# Patient Record
Sex: MALE | Race: WHITE | NOT HISPANIC OR LATINO | Employment: UNEMPLOYED | URBAN - METROPOLITAN AREA
[De-identification: names, ages, dates, MRNs, and addresses within clinical notes are randomized per-mention and may not be internally consistent; named-entity substitution may affect disease eponyms.]

---

## 2021-07-16 ENCOUNTER — TELEPHONE (OUTPATIENT)
Dept: URGENT CARE | Facility: CLINIC | Age: 46
End: 2021-07-16

## 2021-07-16 NOTE — TELEPHONE ENCOUNTER
Please contact patient to make sure that his symptoms have resolved, and that he has had or scheduled PCP follow-up.  He was concerned about a reaction to the Covid vaccine.  I would expect that the symptoms will have been completely resolved by this point.  If there is worsening of any sort, then he needs ER evaluation or to call his PCP promptly.

## 2021-07-19 NOTE — TELEPHONE ENCOUNTER
MESSAGE LEFT FOR PATIENT TO CALL TO UPDATE US ON HIS SYMPTOMS    07/19/21 1989. PATIENT CALLED BACK AND STATES HIS SYMPTOMS HAVE RESOLVED